# Patient Record
Sex: FEMALE | Race: WHITE | NOT HISPANIC OR LATINO | URBAN - METROPOLITAN AREA
[De-identification: names, ages, dates, MRNs, and addresses within clinical notes are randomized per-mention and may not be internally consistent; named-entity substitution may affect disease eponyms.]

---

## 2020-07-25 ENCOUNTER — TELEPHONE ENCOUNTER (OUTPATIENT)
Dept: URBAN - METROPOLITAN AREA CLINIC 13 | Facility: CLINIC | Age: 60
End: 2020-07-25

## 2020-07-26 ENCOUNTER — TELEPHONE ENCOUNTER (OUTPATIENT)
Dept: URBAN - METROPOLITAN AREA CLINIC 13 | Facility: CLINIC | Age: 60
End: 2020-07-26

## 2020-07-26 RX ORDER — CALCIUM CARBONATE/VITAMIN D3 600 MG-20
TAKE 2 TABLET DAILY TABLET ORAL
Refills: 0 | Status: ACTIVE | COMMUNITY
Start: 2010-12-28

## 2020-07-26 RX ORDER — MIRABEGRON 25 MG/1
TAKE 1 TABLET DAILY TABLET, FILM COATED, EXTENDED RELEASE ORAL
Refills: 0 | Status: ACTIVE | COMMUNITY
Start: 2016-03-11

## 2020-07-26 RX ORDER — ETANERCEPT 50 MG/ML
INJECT 50 ML WEEKLY SOLUTION SUBCUTANEOUS
Refills: 0 | Status: ACTIVE | COMMUNITY
Start: 2016-03-11

## 2020-07-26 RX ORDER — MULTIVITAMIN
TAKE 1 CAPSULE DAILY CAPSULE ORAL
Refills: 0 | Status: ACTIVE | COMMUNITY
Start: 2016-03-11

## 2022-02-14 ENCOUNTER — TELEPHONE ENCOUNTER (OUTPATIENT)
Dept: URBAN - METROPOLITAN AREA CLINIC 72 | Facility: CLINIC | Age: 62
End: 2022-02-14

## 2022-03-25 ENCOUNTER — WEB ENCOUNTER (OUTPATIENT)
Dept: URBAN - METROPOLITAN AREA CLINIC 113 | Facility: CLINIC | Age: 62
End: 2022-03-25

## 2022-03-29 ENCOUNTER — DASHBOARD ENCOUNTERS (OUTPATIENT)
Age: 62
End: 2022-03-29

## 2022-03-29 ENCOUNTER — TELEPHONE ENCOUNTER (OUTPATIENT)
Dept: URBAN - METROPOLITAN AREA CLINIC 113 | Facility: CLINIC | Age: 62
End: 2022-03-29

## 2022-03-29 ENCOUNTER — OFFICE VISIT (OUTPATIENT)
Dept: URBAN - METROPOLITAN AREA CLINIC 113 | Facility: CLINIC | Age: 62
End: 2022-03-29
Payer: COMMERCIAL

## 2022-03-29 ENCOUNTER — LAB OUTSIDE AN ENCOUNTER (OUTPATIENT)
Dept: URBAN - METROPOLITAN AREA CLINIC 113 | Facility: CLINIC | Age: 62
End: 2022-03-29

## 2022-03-29 VITALS
BODY MASS INDEX: 21.22 KG/M2 | TEMPERATURE: 96.9 F | HEART RATE: 53 BPM | SYSTOLIC BLOOD PRESSURE: 115 MMHG | WEIGHT: 140 LBS | RESPIRATION RATE: 20 BRPM | DIASTOLIC BLOOD PRESSURE: 73 MMHG | HEIGHT: 68 IN

## 2022-03-29 DIAGNOSIS — Z80.0 FAMILY HISTORY OF COLON CANCER: ICD-10-CM

## 2022-03-29 DIAGNOSIS — Z86.010 HISTORY OF COLON POLYPS: ICD-10-CM

## 2022-03-29 DIAGNOSIS — K21.9 GERD WITHOUT ESOPHAGITIS: ICD-10-CM

## 2022-03-29 PROBLEM — 428283002: Status: ACTIVE | Noted: 2022-03-29

## 2022-03-29 PROCEDURE — 99204 OFFICE O/P NEW MOD 45 MIN: CPT | Performed by: INTERNAL MEDICINE

## 2022-03-29 RX ORDER — SODIUM, POTASSIUM,MAG SULFATES 17.5-3.13G
354ML SOLUTION, RECONSTITUTED, ORAL ORAL
Qty: 354 MILLILITER | Refills: 1 | OUTPATIENT

## 2022-03-29 RX ORDER — MIRABEGRON 25 MG/1
TAKE 1 TABLET DAILY TABLET, FILM COATED, EXTENDED RELEASE ORAL
Refills: 0 | Status: ON HOLD | COMMUNITY
Start: 2016-03-11

## 2022-03-29 RX ORDER — CHOLECALCIFEROL (VITAMIN D3) 10 MCG
1 CAPSULE CAPSULE ORAL ONCE A DAY
Status: ACTIVE | COMMUNITY

## 2022-03-29 RX ORDER — ROSUVASTATIN CALCIUM 40 MG/1
1 TABLET TABLET, FILM COATED ORAL ONCE A DAY
Status: ACTIVE | COMMUNITY

## 2022-03-29 RX ORDER — MULTIVITAMIN
TAKE 1 CAPSULE DAILY CAPSULE ORAL
Refills: 0 | Status: ON HOLD | COMMUNITY
Start: 2016-03-11

## 2022-03-29 RX ORDER — ETANERCEPT 50 MG/ML
INJECT 50 ML WEEKLY SOLUTION SUBCUTANEOUS
Refills: 0 | Status: ACTIVE | COMMUNITY
Start: 2016-03-11

## 2022-03-29 RX ORDER — ASCORBIC ACID 125 MG
2 GUMMIES TABLET,CHEWABLE ORAL ONCE DAILY
Status: ACTIVE | COMMUNITY

## 2022-03-29 RX ORDER — PANTOPRAZOLE SODIUM 40 MG/1
1 TABLET TABLET, DELAYED RELEASE ORAL ONCE A DAY
Status: ACTIVE | COMMUNITY

## 2022-03-29 RX ORDER — CALCIUM CARBONATE/VITAMIN D3 600 MG-20
TAKE 2 TABLET DAILY TABLET ORAL
Refills: 0 | Status: ACTIVE | COMMUNITY
Start: 2010-12-28

## 2022-03-29 RX ORDER — ASPIRIN 81 MG/1
1 TABLET TABLET, COATED ORAL ONCE A DAY
Status: ACTIVE | COMMUNITY

## 2022-03-29 RX ORDER — SODIUM, POTASSIUM,MAG SULFATES 17.5-3.13G
354ML SOLUTION, RECONSTITUTED, ORAL ORAL
Qty: 354 MILLILITER | Refills: 0 | OUTPATIENT
Start: 2022-03-29 | End: 2022-03-30

## 2022-03-29 NOTE — EXAM-FUNCTIONAL ASSESSMENT
General--no acute distress Eyes--anicteric HENT--normocephalic, atraumatic head Neck--no lymphadenopathy Chest--normal breath sounds Heart--regular rate and rhythm Abdomen--soft, RUQ and epigastrium tender, non distended, bowel sounds present Musculoskeletal--normal gait and station Skin--no rashes Neurologic--Alert and oriented x 3 Psychiatric--stable mood, appropriate affect

## 2022-03-29 NOTE — HPI-TODAY'S VISIT:
Nancy Welsh is a 61-year-old female with history of GERD, transiently elevated LFTs (2010) with resolution and negative workup (2011), personal history of colon polyps, and family history of colon cancer (mother) presenting to the office today for follow up. She was last seen here in 2016 following a surveillance colonoscopy.     Her prior colonoscopy in 2011 was notable for torturous colon, scattered left-sided diverticular disease, and a small right-sided hyperplastic polyp.   Surveillance colonoscopy on 5/2/16 noted a BBPS score of 9, pancolonic diverticulosis, removal of a 8 mm distal transverse colon tubular adenoma, and a normal terminal ileum.  Recommend surveillance 5 years.   The patient  has been having episodic right-sided chest pain below her right breast.  This occurs sporadically, lasting 2 to 5 minutes per episode, and is not exertional.  She cannot describe any exacerbating symptoms.  She has no associated odynophagia, dysphagia, nausea, vomiting, melena, bright red rectal bleeding, etc.  Maximal intensity of this pain is 8 out of 10 in intensity.  She describes this as a deep ache.  It can radiate into her right jaw and into the right side of her back.  She did have an abdominal ultrasound examination done which was reportedly negative.  She also had a CT angiography which was negative.  She saw Dr. Keyur Blas of cardiology, and he felt this was not cardiac in origin.  She has had no fevers, chills, sweats, jaundice, etc.  Symptoms have been present for 8 months.  The patient also has had some perianal itching, typically at night, as well as episodic nocturnal rectal pain, like a deep ache, lasting 1 to 2 minutes at a time before resolving.  This is also sporadic.  None of her symptoms are affected by food ingestion or defecation.

## 2022-05-17 PROBLEM — 266435005: Status: ACTIVE | Noted: 2022-03-29

## 2022-06-01 ENCOUNTER — OFFICE VISIT (OUTPATIENT)
Dept: URBAN - METROPOLITAN AREA MEDICAL CENTER 19 | Facility: MEDICAL CENTER | Age: 62
End: 2022-06-01
Payer: COMMERCIAL

## 2022-06-01 DIAGNOSIS — K64.0 BLEEDING GRADE I HEMORRHOIDS: ICD-10-CM

## 2022-06-01 DIAGNOSIS — R12 HEARTBURN: ICD-10-CM

## 2022-06-01 DIAGNOSIS — Z12.11 COLON CANCER SCREENING: ICD-10-CM

## 2022-06-01 DIAGNOSIS — R07.89 ATYPICAL CHEST PAIN: ICD-10-CM

## 2022-06-01 DIAGNOSIS — K52.831 COLLAGENOUS COLITIS: ICD-10-CM

## 2022-06-01 DIAGNOSIS — K57.30 ACQUIRED DIVERTICULOSIS OF COLON: ICD-10-CM

## 2022-06-01 DIAGNOSIS — K21.9 ACID REFLUX: ICD-10-CM

## 2022-06-01 DIAGNOSIS — D12.2 ADENOMA OF ASCENDING COLON: ICD-10-CM

## 2022-06-01 DIAGNOSIS — Z86.010 ADENOMAS PERSONAL HISTORY OF COLONIC POLYPS: ICD-10-CM

## 2022-06-01 PROCEDURE — 43235 EGD DIAGNOSTIC BRUSH WASH: CPT | Performed by: INTERNAL MEDICINE

## 2022-06-01 PROCEDURE — 45385 COLONOSCOPY W/LESION REMOVAL: CPT | Performed by: INTERNAL MEDICINE

## 2022-06-16 PROBLEM — 19311003 COLLAGENOUS COLITIS: Status: ACTIVE | Noted: 2022-06-16

## 2022-06-16 PROBLEM — 698065002 ACID REFLUX: Status: ACTIVE | Noted: 2022-06-16

## 2022-07-21 ENCOUNTER — OFFICE VISIT (OUTPATIENT)
Dept: URBAN - METROPOLITAN AREA CLINIC 113 | Facility: CLINIC | Age: 62
End: 2022-07-21